# Patient Record
Sex: FEMALE | Race: WHITE | NOT HISPANIC OR LATINO | URBAN - NONMETROPOLITAN AREA
[De-identification: names, ages, dates, MRNs, and addresses within clinical notes are randomized per-mention and may not be internally consistent; named-entity substitution may affect disease eponyms.]

---

## 2019-08-02 ENCOUNTER — IMPORTED ENCOUNTER (OUTPATIENT)
Dept: URBAN - NONMETROPOLITAN AREA CLINIC 1 | Facility: CLINIC | Age: 24
End: 2019-08-02

## 2019-08-02 PROBLEM — H52.223: Noted: 2019-08-02

## 2019-08-02 PROBLEM — H52.13: Noted: 2019-08-02

## 2019-08-02 PROCEDURE — 92310 CONTACT LENS FITTING OU: CPT

## 2019-08-02 PROCEDURE — S0620 ROUTINE OPHTHALMOLOGICAL EXA: HCPCS

## 2019-08-02 NOTE — PATIENT DISCUSSION
Compound Myopic Astigmatism OU-  discussed findings w/patient-  new spectacle Rx issued-  CL trials dispensed OD: Air Optix with Hydraglyde OD -4.25 OS: Air Optix for Astigmatism OS -4.00-0.75x160.-  RTC 2 week CL check or prn; 's Notes: MR 8/2/2019<br />DFE 8/2/2019<br />

## 2019-08-16 ENCOUNTER — IMPORTED ENCOUNTER (OUTPATIENT)
Dept: URBAN - NONMETROPOLITAN AREA CLINIC 1 | Facility: CLINIC | Age: 24
End: 2019-08-16

## 2019-08-16 NOTE — PATIENT DISCUSSION
CL check-  discussed findings w/patient-  new CL Rx issued-  continue to monitor yearly or prn; 's Notes: MR 8/2/2019DFE 8/2/2019

## 2022-04-10 ASSESSMENT — VISUAL ACUITY
OS_SC: 20/20-1
OD_SC: 20/20
OD_SC: 20/25
OS_SC: 20/20
OU_SC: 20/20

## 2022-04-10 ASSESSMENT — TONOMETRY
OD_IOP_MMHG: 09
OS_IOP_MMHG: 09